# Patient Record
Sex: MALE | Race: WHITE | Employment: OTHER | URBAN - METROPOLITAN AREA
[De-identification: names, ages, dates, MRNs, and addresses within clinical notes are randomized per-mention and may not be internally consistent; named-entity substitution may affect disease eponyms.]

---

## 2017-04-20 PROBLEM — IMO0002 TRIGGER FINGER OF BOTH HANDS: Status: ACTIVE | Noted: 2017-04-20

## 2017-10-26 PROCEDURE — 81003 URINALYSIS AUTO W/O SCOPE: CPT | Performed by: INTERNAL MEDICINE

## 2018-10-31 PROBLEM — G47.33 OSA (OBSTRUCTIVE SLEEP APNEA): Status: ACTIVE | Noted: 2018-10-31

## 2018-10-31 PROBLEM — R35.0 BENIGN PROSTATIC HYPERPLASIA WITH URINARY FREQUENCY: Status: ACTIVE | Noted: 2018-10-31

## 2018-10-31 PROBLEM — M47.819 SPONDYLO-ARTHROPATHY: Status: ACTIVE | Noted: 2018-10-31

## 2018-10-31 PROBLEM — N40.1 BENIGN PROSTATIC HYPERPLASIA WITH URINARY FREQUENCY: Status: ACTIVE | Noted: 2018-10-31

## 2018-11-05 PROBLEM — R73.9 HYPERGLYCEMIA: Status: ACTIVE | Noted: 2018-11-05

## 2018-11-05 PROCEDURE — 81003 URINALYSIS AUTO W/O SCOPE: CPT | Performed by: FAMILY MEDICINE

## 2019-03-15 PROBLEM — E66.09 CLASS 1 OBESITY DUE TO EXCESS CALORIES WITHOUT SERIOUS COMORBIDITY WITH BODY MASS INDEX (BMI) OF 34.0 TO 34.9 IN ADULT: Status: ACTIVE | Noted: 2019-03-15

## 2019-09-26 PROBLEM — N18.30 CKD (CHRONIC KIDNEY DISEASE) STAGE 3, GFR 30-59 ML/MIN (HCC): Status: ACTIVE | Noted: 2019-09-26

## 2020-05-28 PROBLEM — E66.09 CLASS 1 OBESITY DUE TO EXCESS CALORIES WITH SERIOUS COMORBIDITY AND BODY MASS INDEX (BMI) OF 31.0 TO 31.9 IN ADULT: Status: ACTIVE | Noted: 2020-05-28

## 2020-06-09 PROBLEM — I48.3 TYPICAL ATRIAL FLUTTER (HCC): Status: ACTIVE | Noted: 2020-06-09

## 2020-12-03 PROBLEM — M47.819 SPONDYLO-ARTHROPATHY: Status: RESOLVED | Noted: 2018-10-31 | Resolved: 2020-12-03

## 2021-01-19 PROBLEM — Z00.00 PREVENTATIVE HEALTH CARE: Status: ACTIVE | Noted: 2021-01-19

## 2021-01-22 PROBLEM — E78.5 DYSLIPIDEMIA: Status: ACTIVE | Noted: 2021-01-22

## 2021-07-26 PROBLEM — I48.0 PAF (PAROXYSMAL ATRIAL FIBRILLATION) (HCC): Status: ACTIVE | Noted: 2021-07-26

## 2021-12-10 PROBLEM — G89.29 CHRONIC BILATERAL LOW BACK PAIN WITH RIGHT-SIDED SCIATICA: Status: ACTIVE | Noted: 2021-12-10

## 2021-12-10 PROBLEM — M54.41 CHRONIC BILATERAL LOW BACK PAIN WITH RIGHT-SIDED SCIATICA: Status: ACTIVE | Noted: 2021-12-10

## 2021-12-10 PROBLEM — Z96.89 S/P INSERTION OF SPINAL CORD STIMULATOR: Status: ACTIVE | Noted: 2021-12-10

## 2021-12-10 PROBLEM — M96.1 LUMBAR POST-LAMINECTOMY SYNDROME: Status: ACTIVE | Noted: 2021-12-10

## 2024-04-29 ENCOUNTER — APPOINTMENT (OUTPATIENT)
Dept: GENERAL RADIOLOGY | Facility: HOSPITAL | Age: 89
End: 2024-04-29
Attending: EMERGENCY MEDICINE
Payer: MEDICARE

## 2024-04-29 ENCOUNTER — HOSPITAL ENCOUNTER (INPATIENT)
Facility: HOSPITAL | Age: 89
LOS: 1 days | Discharge: HOME | End: 2024-04-30
Attending: EMERGENCY MEDICINE | Admitting: HOSPITALIST
Payer: MEDICARE

## 2024-04-29 ENCOUNTER — APPOINTMENT (OUTPATIENT)
Dept: MRI IMAGING | Facility: HOSPITAL | Age: 89
End: 2024-04-29
Attending: EMERGENCY MEDICINE
Payer: MEDICARE

## 2024-04-29 DIAGNOSIS — R27.0 ATAXIA: Primary | ICD-10-CM

## 2024-04-29 LAB
ALBUMIN SERPL-MCNC: 3.4 G/DL (ref 3.4–5)
ALBUMIN/GLOB SERPL: 1 {RATIO} (ref 1–2)
ALP LIVER SERPL-CCNC: 77 U/L
ALT SERPL-CCNC: 22 U/L
ANION GAP SERPL CALC-SCNC: 2 MMOL/L (ref 0–18)
AST SERPL-CCNC: 23 U/L (ref 15–37)
BASOPHILS # BLD AUTO: 0.04 X10(3) UL (ref 0–0.2)
BASOPHILS NFR BLD AUTO: 0.5 %
BILIRUB SERPL-MCNC: 0.8 MG/DL (ref 0.1–2)
BUN BLD-MCNC: 19 MG/DL (ref 9–23)
CALCIUM BLD-MCNC: 8.8 MG/DL (ref 8.5–10.1)
CHLORIDE SERPL-SCNC: 106 MMOL/L (ref 98–112)
CO2 SERPL-SCNC: 30 MMOL/L (ref 21–32)
CREAT BLD-MCNC: 1.14 MG/DL
EGFRCR SERPLBLD CKD-EPI 2021: 61 ML/MIN/1.73M2 (ref 60–?)
EOSINOPHIL # BLD AUTO: 0.27 X10(3) UL (ref 0–0.7)
EOSINOPHIL NFR BLD AUTO: 3.6 %
ERYTHROCYTE [DISTWIDTH] IN BLOOD BY AUTOMATED COUNT: 12.3 %
GLOBULIN PLAS-MCNC: 3.4 G/DL (ref 2.8–4.4)
GLUCOSE BLD-MCNC: 112 MG/DL (ref 70–99)
HCT VFR BLD AUTO: 45.7 %
HGB BLD-MCNC: 15.7 G/DL
IMM GRANULOCYTES # BLD AUTO: 0.02 X10(3) UL (ref 0–1)
IMM GRANULOCYTES NFR BLD: 0.3 %
LYMPHOCYTES # BLD AUTO: 1.48 X10(3) UL (ref 1–4)
LYMPHOCYTES NFR BLD AUTO: 19.9 %
MCH RBC QN AUTO: 34 PG (ref 26–34)
MCHC RBC AUTO-ENTMCNC: 34.4 G/DL (ref 31–37)
MCV RBC AUTO: 98.9 FL
MONOCYTES # BLD AUTO: 0.7 X10(3) UL (ref 0.1–1)
MONOCYTES NFR BLD AUTO: 9.4 %
NEUTROPHILS # BLD AUTO: 4.91 X10 (3) UL (ref 1.5–7.7)
NEUTROPHILS # BLD AUTO: 4.91 X10(3) UL (ref 1.5–7.7)
NEUTROPHILS NFR BLD AUTO: 66.3 %
OSMOLALITY SERPL CALC.SUM OF ELEC: 289 MOSM/KG (ref 275–295)
PLATELET # BLD AUTO: 231 10(3)UL (ref 150–450)
POTASSIUM SERPL-SCNC: 4.3 MMOL/L (ref 3.5–5.1)
PROT SERPL-MCNC: 6.8 G/DL (ref 6.4–8.2)
RBC # BLD AUTO: 4.62 X10(6)UL
SODIUM SERPL-SCNC: 138 MMOL/L (ref 136–145)
TROPONIN I SERPL HS-MCNC: <3 NG/L
WBC # BLD AUTO: 7.4 X10(3) UL (ref 4–11)

## 2024-04-29 PROCEDURE — 70551 MRI BRAIN STEM W/O DYE: CPT | Performed by: EMERGENCY MEDICINE

## 2024-04-29 PROCEDURE — 71045 X-RAY EXAM CHEST 1 VIEW: CPT | Performed by: EMERGENCY MEDICINE

## 2024-04-29 RX ORDER — BISACODYL 10 MG
10 SUPPOSITORY, RECTAL RECTAL
Status: DISCONTINUED | OUTPATIENT
Start: 2024-04-29 | End: 2024-04-30

## 2024-04-29 RX ORDER — ASCORBIC ACID 500 MG
500 TABLET ORAL DAILY
Status: DISCONTINUED | OUTPATIENT
Start: 2024-04-29 | End: 2024-04-30

## 2024-04-29 RX ORDER — FLUTICASONE PROPIONATE 50 MCG
2 SPRAY, SUSPENSION (ML) NASAL DAILY
Status: DISCONTINUED | OUTPATIENT
Start: 2024-04-29 | End: 2024-04-30

## 2024-04-29 RX ORDER — ACETAMINOPHEN 325 MG/1
650 TABLET ORAL 2 TIMES DAILY
Status: ON HOLD | COMMUNITY
End: 2024-04-29

## 2024-04-29 RX ORDER — MECLIZINE HCL 12.5 MG/1
12.5 TABLET ORAL 3 TIMES DAILY PRN
Status: DISCONTINUED | OUTPATIENT
Start: 2024-04-29 | End: 2024-04-30

## 2024-04-29 RX ORDER — ATORVASTATIN CALCIUM 10 MG/1
10 TABLET, FILM COATED ORAL NIGHTLY
Status: DISCONTINUED | OUTPATIENT
Start: 2024-04-29 | End: 2024-04-30

## 2024-04-29 RX ORDER — DILTIAZEM HYDROCHLORIDE 120 MG/1
120 CAPSULE, EXTENDED RELEASE ORAL DAILY
Status: DISCONTINUED | OUTPATIENT
Start: 2024-04-29 | End: 2024-04-30

## 2024-04-29 RX ORDER — ENEMA 19; 7 G/133ML; G/133ML
1 ENEMA RECTAL ONCE AS NEEDED
Status: DISCONTINUED | OUTPATIENT
Start: 2024-04-29 | End: 2024-04-30

## 2024-04-29 RX ORDER — TAMSULOSIN HYDROCHLORIDE 0.4 MG/1
0.8 CAPSULE ORAL DAILY
Status: DISCONTINUED | OUTPATIENT
Start: 2024-04-30 | End: 2024-04-30

## 2024-04-29 RX ORDER — ACETAMINOPHEN 500 MG
500 TABLET ORAL EVERY 4 HOURS PRN
Status: DISCONTINUED | OUTPATIENT
Start: 2024-04-29 | End: 2024-04-30

## 2024-04-29 RX ORDER — POLYETHYLENE GLYCOL 3350 17 G/17G
17 POWDER, FOR SOLUTION ORAL DAILY PRN
Status: DISCONTINUED | OUTPATIENT
Start: 2024-04-29 | End: 2024-04-30

## 2024-04-29 RX ORDER — DICYCLOMINE HCL 20 MG
20 TABLET ORAL AS NEEDED
Status: DISCONTINUED | OUTPATIENT
Start: 2024-04-29 | End: 2024-04-30

## 2024-04-29 RX ORDER — HYDROMORPHONE HYDROCHLORIDE 1 MG/ML
0.5 INJECTION, SOLUTION INTRAMUSCULAR; INTRAVENOUS; SUBCUTANEOUS EVERY 30 MIN PRN
Status: ACTIVE | OUTPATIENT
Start: 2024-04-29 | End: 2024-04-29

## 2024-04-29 RX ORDER — SENNOSIDES 8.6 MG
17.2 TABLET ORAL NIGHTLY PRN
Status: DISCONTINUED | OUTPATIENT
Start: 2024-04-29 | End: 2024-04-30

## 2024-04-29 RX ORDER — ONDANSETRON 2 MG/ML
4 INJECTION INTRAMUSCULAR; INTRAVENOUS EVERY 4 HOURS PRN
Status: ACTIVE | OUTPATIENT
Start: 2024-04-29 | End: 2024-04-29

## 2024-04-29 RX ORDER — FLUTICASONE PROPIONATE 50 MCG
2 SPRAY, SUSPENSION (ML) NASAL DAILY
COMMUNITY

## 2024-04-29 RX ORDER — LEVOTHYROXINE SODIUM 0.05 MG/1
50 TABLET ORAL DAILY
Status: DISCONTINUED | OUTPATIENT
Start: 2024-04-30 | End: 2024-04-30

## 2024-04-29 NOTE — ED QUICK NOTES
Pt arrives to ED A&O x4, pt able to articulate why he is here in the ED today. Pt moving all extremities and specifies his bouts of dizziness with standing still and looking down or to the right or left.  Pt denies recent sinus/inner ear infections.

## 2024-04-29 NOTE — ED QUICK NOTES
Spoke to MRI regarding the pt neuro simulator, MRI ask for make and model, this RN gave MRI that information. MRI requesting remote for the stimulator. Pt aware, pt will have his son bring the remote to the hospital.     Please call MRI once the remote arrives to the hospital. Pt aware of this plan.

## 2024-04-29 NOTE — H&P
.CC:   Chief Complaint   Patient presents with    Dizziness        PCP: Darien Bocanegra MD    History of Present Illness: Patient is a 89 year old male with PMH sig for aflutter on eliquis who presented with vertigo over past 2 days, particularly when looking up or down. Never had this before. Had persisted long enough that he decided to come in. No headaches, f/c, sob/cough, n/v/d/c, urinary sx. No numbness/tingling/weakness. When he ambulated in ED he was noted to be quite unstable/ataxic.       PMH  Past Medical History:    Actinic keratosis    Dr. Brush    Arthritis of right knee    had ortho visc x 3 in [place], [place], doing well 10/10/13    Atrial flutter (HCC)    Carotid stenosis    16-49% bilat, stable 2012    Creatinine elevation    1.38 [facility] ER    ED (erectile dysfunction)    viagra 100 helps sometimes    Eye tearing, bilateral    Foot pain, right    went to [facility] ER. art doppler nl. treated with gabapentin, resolved in 3 days    Hypertrophy of prostate without urinary obstruction and other lower urinary tract symptoms (LUTS)    Hypothyroid    Irritable bowel syndrome    dicyclomine works very well, has been on for years.     Left knee pain    bothers him from time to time    Living will in place    Lumbar radiculopathy, right    had ANTHONY, still with R leg weakness, foot and ankle numbness. has seen spine and pain mgmt. in therapy 10/20/16    Normal eye exam    Dr. Horowitz, Dr. Escobar in [place]. chronic water eyes.     Normal nuclear stress test    EF 66%    Obesity    VANESSA (obstructive sleep apnea)    treated with UPPP    Other and unspecified hyperlipidemia    Personal history of other malignant neoplasm of skin    Dr. Payne sees twice a year, Dr. Matute for Mohs surgery    Presbyacusia    hearing aides    RBBB    Screening for cardiovascular condition    echo with trace MR, mild pulm htn, mild AI    Screening for osteoporosis    normal dexa    Seasonal allergic rhinitis    springtime to mid  July    Skin cancer    Dr. Payne sees twice a year, Dr. Matute for Mohs surgery    Syncope    w/u neg    Trigger finger of both hands    Unspecified essential hypertension        PSH  Past Surgical History:   Procedure Laterality Date    Appendectomy      Colonoscopy,diagnostic  1-02, 4-4-07    tubular adenoma, saw Dr. Limon 8/30/12, deferred due to low risk    Inguinal hernia repair Left 2003    Injection, anesthetic/steroid, transforaminal epidural; lumbar/sacral, single level Left 6/9/2015    Procedure: TRANSFORAMINAL EPIDURAL - LUMBAR;  Surgeon: Hank Green MD;  Location: [facility]    Injection, anesthetic/steroid, transforaminal epidural; lumbar/sacral, single level Left 6/30/2015    Procedure: TRANSFORAMINAL EPIDURAL - LUMBAR;  Surgeon: Hank Green MD;  Location: [facility]    Injection, anesthetic/steroid, transforaminal epidural; lumbar/sacral, single level N/A 7/14/2015    Procedure: LUMBAR EPIDURAL;  Surgeon: Hank Green MD;  Location: [facility]    Injection, anesthetic/steroid, transforaminal epidural; lumbar/sacral, single level N/A 9/3/2015    Procedure: TRANSFORAMINAL EPIDURAL - LUMBAR;  Surgeon: Vick Romeo MD;  Location: [facility]    Injection, anesthetic/steroid, transforaminal epidural; lumbar/sacral, single level Right 7/14/2016    Procedure: TRANSFORAMINAL EPIDURAL - LUMBAR;  Surgeon: Hank Green MD;  Location: [facility]    Injection, anesthetic/steroid, transforaminal epidural; lumbar/sacral, single level Right 7/28/2016    Procedure: TRANSFORAMINAL EPIDURAL - LUMBAR;  Surgeon: Hank Green MD;  Location: [facility]    Other surgical history  9/24/15    L2-S1 lami fusion, Dr. Lindsey at [place]    Patient documented not to have experienced any of the following events Right 7/14/2016    Procedure: TRANSFORAMINAL EPIDURAL - LUMBAR;  Surgeon:  Hank Green MD;  Location: [facility]    Patient documented not to have experienced any of the following events Right 2016    Procedure: TRANSFORAMINAL EPIDURAL - LUMBAR;  Surgeon: Hank Green MD;  Location: [facility]    Patient withough preoperative order for iv antibiotic surgical site infection prophylaxis. Right 2016    Procedure: TRANSFORAMINAL EPIDURAL - LUMBAR;  Surgeon: Hank Green MD;  Location: [facility]    Patient withough preoperative order for iv antibiotic surgical site infection prophylaxis. Right 2016    Procedure: TRANSFORAMINAL EPIDURAL - LUMBAR;  Surgeon: Hank Green MD;  Location: [facility]    Skin surgery  1-3-12    BCC with Squamous diff to left nasal tip/ mohs    Special service or report Right     skin Ca on R shoulder    Special service or report      UPPP for sleep apnea    Spinal fusion  2015    L2 through S1     Tonsillectomy          ALL:  Allergies   Allergen Reactions    Seasonal      Itchy eyes, runny nose        Home Medications:  No outpatient medications have been marked as taking for the 24 encounter (Hospital Encounter).         Soc Hx  Social History     Tobacco Use    Smoking status: Former     Current packs/day: 0.00     Average packs/day: 3.0 packs/day for 18.0 years (54.0 ttl pk-yrs)     Types: Cigarettes     Start date: 1953     Quit date: 1971     Years since quittin.3    Smokeless tobacco: Never   Substance Use Topics    Alcohol use: Yes     Alcohol/week: 14.0 standard drinks of alcohol     Types: 7 Glasses of wine, 7 Cans of beer per week        Fam Hx  Family History   Problem Relation Age of Onset    Obesity Father     Cancer Father         prostate    Cancer Mother         lung    Heart Disorder Sister     Cancer Daughter         skin    Cancer Daughter         skin    Cancer Daughter         skin    Other (hip  replacement[other]) Daughter     Other ([other]) Daughter         crohn's    Other ([other]) Daughter         skin prob    Other ([other]) Sister         TKR bilat, venous stasis, CTS    Other (GERD[other]) Son     Cancer Son 58        prostate    Other (hemochromatosis[other]) Son     Cancer Son         skin ca    Cancer Son         skin cancer       Review of Systems  Comprehensive ROS reviewed and negative except for what's stated above.       OBJECTIVE:  /70   Pulse 80   Temp 97.9 °F (36.6 °C) (Temporal)   Resp 20   Ht 5' 7\" (1.702 m)   Wt 200 lb (90.7 kg)   SpO2 95%   BMI 31.32 kg/m²     Gen- NAD, appears stated age  HEENT- NCAT, anicteric sclera, MMM, OP clear  Lymph- no cervical LAD  CV- RRR no murmurs. No MONICA  Lungs- CTAB, good respiratory effort  Abd- soft, ntnd, no organomegaly, BS+  Derm- no rashes  MSK- good muscle strength and tone, no joint swelling  Neuro- A&OX3, no focal deficits. Nl finger-nose. No visible nystagmus      Diagnostic Data:    CBC/Chem  Recent Labs   Lab 04/29/24  0857   WBC 7.4   HGB 15.7   MCV 98.9   .0       Recent Labs   Lab 04/29/24  0857      K 4.3      CO2 30.0   BUN 19   CREATSERUM 1.14   *   CA 8.8       Recent Labs   Lab 04/29/24  0857   ALT 22   AST 23   ALB 3.4       No results for input(s): \"TROP\" in the last 168 hours.    Additional Diagnostics: personally reviewed EKG- nsr, RBBB, LAFB    CXR: image personally reviewed- b/l patchy infiltrates at bases    Radiology: XR CHEST AP PORTABLE  (CPT=71045)    Result Date: 4/29/2024  PROCEDURE:  XR CHEST AP PORTABLE  (CPT=71045)  TECHNIQUE:  AP chest radiograph was obtained.  COMPARISON:  None.  INDICATIONS:  weakness and dizziness assess for CHF  PATIENT STATED HISTORY: (As transcribed by Technologist)  Patient was feeling weak and dizzy.    FINDINGS:  Patchy opacity in the lower lungs may represent developing infiltrates.  Clinical correlation recommended along with follow-up to confirm  resolution.  Degenerative changes in the shoulders and spine.  Cardiomegaly with normal pulmonary vascularity. No pleural effusion or pneumothorax.            CONCLUSION:  Patchy opacity in the lower lungs may represent developing infiltrates.  Clinical correlation recommended along with follow-up to confirm resolution.    LOCATION:  [id number]      Dictated by (CST): Sea Garcia MD on 4/29/2024 at 10:02 AM     Finalized by (CST): Sea Garcia MD on 4/29/2024 at 10:03 AM          Available outpatient records reviewed--    ASSESSMENT / PLAN:   89 yo man with h/o aflutter on eliquis who presented with vertigo/ataxia.    Vertigo, ataxia  - need to r/o brainstem CVA, await MRI  - neuro consult  - could consider trying meclizine    Aflutter  - eliquis, cartia    Hl- cont statin    scds          Abigail Pavon MD  [facility] Hospitalist  Pager [phone]  Answering Service number: [phone]

## 2024-04-29 NOTE — ED PROVIDER NOTES
Patient Seen in: [facility] Emergency Department      History     Chief Complaint   Patient presents with    Dizziness     Stated Complaint:     Subjective:   HPI    89-year-old male who presents here to the emergency department complaint of occasional dizziness.  The patient 70 moves his head he starts to feel somewhat lightheaded like the world spinning.  No episodes of syncope or near syncope.  Said he almost stumbled earlier today when he was walking came to the ER for evaluation.  No palpitations or chest pain.  No shortness of breath.  Symptoms occur when he looks down.  Reviewing his records he was last seen for an office visit on 2/1/2024 for atrial flutter.    Objective:   Past Medical History:    Actinic keratosis    Dr. Brush    Arthritis of right knee    had ortho visc x 3 in [place], [place], doing well 10/10/13    Atrial flutter (HCC)    Carotid stenosis    16-49% bilat, stable 2012    Creatinine elevation    1.38 [facility] ER    ED (erectile dysfunction)    viagra 100 helps sometimes    Eye tearing, bilateral    Foot pain, right    went to [facility] ER. art doppler nl. treated with gabapentin, resolved in 3 days    Hypertrophy of prostate without urinary obstruction and other lower urinary tract symptoms (LUTS)    Hypothyroid    Irritable bowel syndrome    dicyclomine works very well, has been on for years.     Left knee pain    bothers him from time to time    Living will in place    Lumbar radiculopathy, right    had ANTHONY, still with R leg weakness, foot and ankle numbness. has seen spine and pain mgmt. in therapy 10/20/16    Normal eye exam    Dr. Horowitz, Dr. Escobar in [place]. chronic water eyes.     Normal nuclear stress test    EF 66%    Obesity    VANESSA (obstructive sleep apnea)    treated with UPPP    Other and unspecified hyperlipidemia    Personal history of other malignant neoplasm of skin    Dr. Payne sees twice a year, Dr. Matute for Mohs surgery    Presbyacusia    hearing aides     RBBB    Screening for cardiovascular condition    echo with trace MR, mild pulm htn, mild AI    Screening for osteoporosis    normal dexa    Seasonal allergic rhinitis    springtime to mid July    Skin cancer    Dr. Payne sees twice a year, Dr. Matute for Mohs surgery    Syncope    w/u neg    Trigger finger of both hands    Unspecified essential hypertension              Past Surgical History:   Procedure Laterality Date    Appendectomy      Colonoscopy,diagnostic  1-02, 4-4-07    tubular adenoma, saw Dr. Limon 8/30/12, deferred due to low risk    Inguinal hernia repair Left 2003    Injection, anesthetic/steroid, transforaminal epidural; lumbar/sacral, single level Left 6/9/2015    Procedure: TRANSFORAMINAL EPIDURAL - LUMBAR;  Surgeon: Hank Green MD;  Location: [facility]    Injection, anesthetic/steroid, transforaminal epidural; lumbar/sacral, single level Left 6/30/2015    Procedure: TRANSFORAMINAL EPIDURAL - LUMBAR;  Surgeon: Hank Green MD;  Location: [facility]    Injection, anesthetic/steroid, transforaminal epidural; lumbar/sacral, single level N/A 7/14/2015    Procedure: LUMBAR EPIDURAL;  Surgeon: Hank Green MD;  Location: [facility]    Injection, anesthetic/steroid, transforaminal epidural; lumbar/sacral, single level N/A 9/3/2015    Procedure: TRANSFORAMINAL EPIDURAL - LUMBAR;  Surgeon: Vick Romeo MD;  Location: [facility]    Injection, anesthetic/steroid, transforaminal epidural; lumbar/sacral, single level Right 7/14/2016    Procedure: TRANSFORAMINAL EPIDURAL - LUMBAR;  Surgeon: Hank Green MD;  Location: [facility]    Injection, anesthetic/steroid, transforaminal epidural; lumbar/sacral, single level Right 7/28/2016    Procedure: TRANSFORAMINAL EPIDURAL - LUMBAR;  Surgeon: Hank Green MD;  Location: [facility]    Other surgical history  9/24/15    L2-S1  davidi Dr. Rosalia figueroa at [place]    Patient documented not to have experienced any of the following events Right 2016    Procedure: TRANSFORAMINAL EPIDURAL - LUMBAR;  Surgeon: Hank Green MD;  Location: [facility]    Patient documented not to have experienced any of the following events Right 2016    Procedure: TRANSFORAMINAL EPIDURAL - LUMBAR;  Surgeon: Hank Green MD;  Location: [facility]    Patient withough preoperative order for iv antibiotic surgical site infection prophylaxis. Right 2016    Procedure: TRANSFORAMINAL EPIDURAL - LUMBAR;  Surgeon: Hank Green MD;  Location: [facility]    Patient withough preoperative order for iv antibiotic surgical site infection prophylaxis. Right 2016    Procedure: TRANSFORAMINAL EPIDURAL - LUMBAR;  Surgeon: Hank Green MD;  Location: [facility]    Skin surgery  1-3-12    BCC with Squamous diff to left nasal tip/ mohs    Special service or report Right     skin Ca on R shoulder    Special service or report      UPPP for sleep apnea    Spinal fusion  2015    L2 through S1     Tonsillectomy                  Social History     Socioeconomic History    Marital status:     Number of children: 12   Occupational History    Occupation: , retired   Tobacco Use    Smoking status: Former     Current packs/day: 0.00     Average packs/day: 3.0 packs/day for 18.0 years (54.0 ttl pk-yrs)     Types: Cigarettes     Start date: 1953     Quit date: 1971     Years since quittin.3    Smokeless tobacco: Never   Vaping Use    Vaping status: Never Used   Substance and Sexual Activity    Alcohol use: Yes     Alcohol/week: 14.0 standard drinks of alcohol     Types: 7 Glasses of wine, 7 Cans of beer per week    Drug use: No    Sexual activity: Yes     Partners: Female   Other Topics Concern     Service No    Blood Transfusions No     Caffeine Concern Yes     Comment: 3-4 cups coffee in am    Occupational Exposure No    Hobby Hazards No    Sleep Concern No    Stress Concern No    Weight Concern Yes     Comment: lost weight w/o trying    Special Diet No    Back Care Yes     Comment: chiropractor    Exercise Yes     Comment: limited due to back issues, recent spinal cord stimulator placed 10/26/17    Bike Helmet No    Seat Belt Yes    Self-Exams No     Comment: Sees Dr. Brush regularly for skin care and cancer f/u              Review of Systems    Positive for stated complaint:   Other systems are as noted in HPI.  Constitutional and vital signs reviewed.      All other systems reviewed and negative except as noted above.    Physical Exam     ED Triage Vitals   BP 04/29/24 0858 (!) 168/68   Pulse 04/29/24 0858 75   Resp 04/29/24 0858 14   Temp 04/29/24 0858 97.9 °F (36.6 °C)   Temp src 04/29/24 0858 Temporal   SpO2 04/29/24 0858 96 %   O2 Device 04/29/24 0900 None (Room air)       Current:BP (!) 161/78   Pulse 72   Temp 97.9 °F (36.6 °C) (Temporal)   Resp 21   Ht 170.2 cm (5' 7\")   Wt 90.7 kg   SpO2 93%   BMI 31.32 kg/m²         Physical Exam  General: This a pleasant nontoxic appearing patient in no apparent distress alert and oriented ×3  HEENT: Pupils are equal reactive to light.  Extra ocular motions are intact.  No scleral icterus or conjunctival pallor: Neck is supple without tenderness on palpation.  Head is atraumatic normocephalic.  Oral mucosa moist.  Tongue is midline.  No posterior pharyngeal lesions.  Lungs: Clear to auscultation bilaterally.  No wheezes, rhonchi, or rales appreciated.  No accessory muscle use noted for breathing.  Cardiac: Regular rate and rhythm.  Normal S1 and 2 without murmurs or ectopy appreciated  Abdomen: Soft on examination without tenderness to deep palpation or to percussion.  No masses appreciated.  Bowel sounds are normoactive.  No CVA tenderness.  Extremities: No cyanosis, no edema or clubbing.   Pulses are +2.  Full range of motion is noted of the extremities without deformities.  No tenderness.  Neurologically intact.  No past-pointing on finger-nose examination.  Heel-to-shin examination is normal.       ED Course     Labs Reviewed   COMP METABOLIC PANEL (14) - Abnormal; Notable for the following components:       Result Value    Glucose 112 (*)     All other components within normal limits   TROPONIN I HIGH SENSITIVITY - Normal   CBC WITH DIFFERENTIAL WITH PLATELET    Narrative:     The following orders were created for panel order CBC With Differential With Platelet.  Procedure                               Abnormality         Status                     ---------                               -----------         ------                     CBC W/ DIFFERENTIAL[[id number]]                              Final result                 Please view results for these tests on the individual orders.   RAINBOW DRAW LAVENDER   RAINBOW DRAW LIGHT GREEN   RAINBOW DRAW BLUE   CBC W/ DIFFERENTIAL     EKG    Rate, intervals and axes as noted on EKG Report.  Rate: 73  Rhythm: Sinus Rhythm  Reading: Sinus rhythm with sinus arrhythmia first-degree AV block.  Right bundle branch block.  Left anterior cecal block.         Narrative  PROCEDURE:  XR CHEST AP PORTABLE  (CPT=71045)     TECHNIQUE:  AP chest radiograph was obtained.     COMPARISON:  None.     INDICATIONS:  weakness and dizziness assess for CHF     PATIENT STATED HISTORY: (As transcribed by Technologist)  Patient was feeling weak and dizzy.         FINDINGS:  Patchy opacity in the lower lungs may represent developing infiltrates.  Clinical correlation recommended along with follow-up to confirm resolution.  Degenerative changes in the shoulders and spine.  Cardiomegaly with normal pulmonary  vascularity. No pleural effusion or pneumothorax.                  Impression  CONCLUSION:  Patchy opacity in the lower lungs may represent developing infiltrates.  Clinical  correlation recommended along with follow-up to confirm resolution.          LOCATION:  [id number]                 Dictated by (CST): Sea Garcia MD on 4/29/2024 at 10:02 AM      Finalized by (CST): Sea Garcia MD on 4/29/2024 at 10:03 AM                   MDM    Differential diagnosis includes but is not limited to near-syncope, electrolyte abnormality, vertigo, symptomatic anemia, cardiac ischemia.  Patient's troponin was normal.  Glucose 112 rest of metabolic and was normal.  CBC was normal.  The patient has been hemodynamically stable here in the emergency department.  He appears well and nontoxic.  Chest x-ray performed  I reviewed the x-rays and agree with the radiologist report that showed patchy opacity in the lower lungs may represent developing infiltrates.  Patient otherwise appears nontoxic and well.  Symptoms may be related to his recent pneumonia he does not appear to have a central nervous system etiology to account for symptoms.  He is otherwise hemodynamically stable.  We will walk the patient to make sure that he is able to ambulate without risk of falling.    Patient has significantly ataxic gait and was unstable.  He will be sent for MRI of the brain to assess for intracranial abnormality that may account for his gait instability.  He says this is brand-new.  I did speak to the hospitalist service about his gait instability as MRI is pending.  I will also contact neurology regarding the patient's ataxia.  MRI still pending will be signed out to my colleague for assessment I work on the bed for admission in the meanwhile.     I did speak to neurology and they agreed that the patient to be admitted in the hospital while MRI is pending.  MRI results signed out to my colleague.              Medical Decision Making      Disposition and Plan     Clinical Impression:  1. Ataxia         Disposition:  There is no disposition on file for this visit.  There is no disposition time on file for this  visit.    Follow-up:  No follow-up provider specified.        Medications Prescribed:  Current Discharge Medication List

## 2024-04-29 NOTE — ED QUICK NOTES
Pt ambulated in the hallway, pt gait was not steady, pt states he is is not usually this unsteady and expressed concern about his vertigo.   ED MD aware.

## 2024-04-29 NOTE — ED INITIAL ASSESSMENT (HPI)
Pt arrives to ED via EMS for evaluation of dizziness and weakness, pt states he has has these intermittent symptoms for about 2 weeks. Pt states he feels dizzy when he is standing and looks down or to the R/L. Pt denies pain, n/v,d, pt states he is eating and drinking ok.

## 2024-04-30 VITALS
DIASTOLIC BLOOD PRESSURE: 57 MMHG | HEIGHT: 67 IN | BODY MASS INDEX: 31.39 KG/M2 | RESPIRATION RATE: 20 BRPM | WEIGHT: 200 LBS | SYSTOLIC BLOOD PRESSURE: 135 MMHG | HEART RATE: 72 BPM | OXYGEN SATURATION: 94 % | TEMPERATURE: 98 F

## 2024-04-30 PROBLEM — R42 VERTIGO: Status: ACTIVE | Noted: 2024-04-30

## 2024-04-30 PROBLEM — H81.09 MENIERE'S DISEASE: Status: ACTIVE | Noted: 2024-04-30

## 2024-04-30 LAB
ATRIAL RATE: 73 BPM
P AXIS: 103 DEGREES
P-R INTERVAL: 228 MS
Q-T INTERVAL: 398 MS
QRS DURATION: 140 MS
QTC CALCULATION (BEZET): 438 MS
R AXIS: -53 DEGREES
T AXIS: 4 DEGREES
VENTRICULAR RATE: 73 BPM

## 2024-04-30 PROCEDURE — 99223 1ST HOSP IP/OBS HIGH 75: CPT | Performed by: OTHER

## 2024-04-30 RX ORDER — MECLIZINE HYDROCHLORIDE 25 MG/1
25 TABLET ORAL 3 TIMES DAILY PRN
Qty: 20 TABLET | Refills: 0 | Status: SHIPPED | OUTPATIENT
Start: 2024-04-30

## 2024-04-30 NOTE — CONSULTS
[facility]   NEUROLOGY   CONSULT NOTE    Admission date: 4/29/2024  Reason for Consult: Dizziness/vertigo.  Chief Complaint:   Chief Complaint   Patient presents with    Dizziness   ________________________________________________________________    History     History of Presenting Illness  89 year old male hyperlipidemia, hypothyroidism, prostate hypertrophy, history of coronary artery disease with atrial flutter currently on anticoagulation with Eliquis, presented with increased symptoms of dizziness and vertigo.  Patient describes symptoms as occurring only when he looks down.  There was no diplopia, dysarthria, dysphagia, new weakness, numbness, paresthesias, confusion, disorientation or loss of consciousness.  Patient has history of hearing loss and wears hearing aid.  He also complains of intermittent tinnitus..  Symptoms spontaneously resolved.  Patient currently does not have any symptoms of positional dizziness/vertigo.    History obtained from patient, his wife and chart review.    Past Medical History:    Actinic keratosis    Dr. Brush    Arrhythmia    Arthritis of right knee    had ortho visc x 3 in [place], [place], doing well 10/10/13    Atrial flutter (HCC)    Carotid stenosis    16-49% bilat, stable 2012    Creatinine elevation    1.38 [facility]    ED (erectile dysfunction)    viagra 100 helps sometimes    Eye tearing, bilateral    Foot pain, right    went to [facility] ER. art doppler nl. treated with gabapentin, resolved in 3 days    Hearing impairment    High cholesterol    Hypertrophy of prostate without urinary obstruction and other lower urinary tract symptoms (LUTS)    Hypothyroid    Irritable bowel syndrome    dicyclomine works very well, has been on for years.     Left knee pain    bothers him from time to time    Living will in place    Lumbar radiculopathy, right    had ANTHONY, still with R leg weakness, foot and ankle numbness. has seen spine and pain mgmt. in therapy  10/20/16    Normal eye exam    Dr. Horowitz, Dr. Escobar in [place]. chronic water eyes.     Normal nuclear stress test    EF 66%    Obesity    VANESSA (obstructive sleep apnea)    treated with UPPP    Other and unspecified hyperlipidemia    Personal history of other malignant neoplasm of skin    Dr. Payne sees twice a year, Dr. Matute for Mohs surgery    Presbyacusia    hearing aides    RBBB    Screening for cardiovascular condition    echo with trace MR, mild pulm htn, mild AI    Screening for osteoporosis    normal dexa    Seasonal allergic rhinitis    springtime to mid July    Skin cancer    Dr. Payne sees twice a year, Dr. Matute for Mohs surgery    Syncope    w/u neg    Trigger finger of both hands    Unspecified essential hypertension    Visual impairment     Past Surgical History:   Procedure Laterality Date    Appendectomy      Colonoscopy,diagnostic  1-02, 4-4-07    tubular adenoma, saw Dr. Limon 8/30/12, deferred due to low risk    Inguinal hernia repair Left 2003    Injection, anesthetic/steroid, transforaminal epidural; lumbar/sacral, single level Left 6/9/2015    Procedure: TRANSFORAMINAL EPIDURAL - LUMBAR;  Surgeon: Hank Green MD;  Location: [facility]    Injection, anesthetic/steroid, transforaminal epidural; lumbar/sacral, single level Left 6/30/2015    Procedure: TRANSFORAMINAL EPIDURAL - LUMBAR;  Surgeon: Hank Green MD;  Location: [facility]    Injection, anesthetic/steroid, transforaminal epidural; lumbar/sacral, single level N/A 7/14/2015    Procedure: LUMBAR EPIDURAL;  Surgeon: Hank Green MD;  Location: [facility]    Injection, anesthetic/steroid, transforaminal epidural; lumbar/sacral, single level N/A 9/3/2015    Procedure: TRANSFORAMINAL EPIDURAL - LUMBAR;  Surgeon: Vick Romeo MD;  Location: [facility]    Injection, anesthetic/steroid, transforaminal epidural; lumbar/sacral, single level Right  7/14/2016    Procedure: TRANSFORAMINAL EPIDURAL - LUMBAR;  Surgeon: Hank Green MD;  Location: [facility]    Injection, anesthetic/steroid, transforaminal epidural; lumbar/sacral, single level Right 7/28/2016    Procedure: TRANSFORAMINAL EPIDURAL - LUMBAR;  Surgeon: Hank Green MD;  Location: [facility]    Other surgical history  9/24/15    L2-S1 lami fusion, Dr. Lindsey at [place]    Patient documented not to have experienced any of the following events Right 7/14/2016    Procedure: TRANSFORAMINAL EPIDURAL - LUMBAR;  Surgeon: Hank Green MD;  Location: [facility]    Patient documented not to have experienced any of the following events Right 7/28/2016    Procedure: TRANSFORAMINAL EPIDURAL - LUMBAR;  Surgeon: Hank Green MD;  Location: [facility]    Patient withough preoperative order for iv antibiotic surgical site infection prophylaxis. Right 7/14/2016    Procedure: TRANSFORAMINAL EPIDURAL - LUMBAR;  Surgeon: Hank Green MD;  Location: [facility]    Patient withough preoperative order for iv antibiotic surgical site infection prophylaxis. Right 7/28/2016    Procedure: TRANSFORAMINAL EPIDURAL - LUMBAR;  Surgeon: Hank Green MD;  Location: [facility]    Skin surgery  1-3-12    BCC with Squamous diff to left nasal tip/ mohs    Special service or report Right 2004    skin Ca on R shoulder    Special service or report  1980's    UPPP for sleep apnea    Spinal fusion  09/24/2015    L2 through S1     Tonsillectomy       Social History     Socioeconomic History    Marital status:     Number of children: 12   Occupational History    Occupation: , retired   Tobacco Use    Smoking status: Former     Current packs/day: 0.00     Average packs/day: 3.0 packs/day for 18.0 years (54.0 ttl pk-yrs)     Types: Cigarettes     Start date: 1/1/1953     Quit date: 1/1/1971      Years since quittin.3    Smokeless tobacco: Never   Vaping Use    Vaping status: Never Used   Substance and Sexual Activity    Alcohol use: Yes     Alcohol/week: 14.0 standard drinks of alcohol     Types: 7 Glasses of wine, 7 Cans of beer per week    Drug use: No    Sexual activity: Yes     Partners: Female   Other Topics Concern     Service No    Blood Transfusions No    Caffeine Concern Yes     Comment: 3-4 cups coffee in am    Occupational Exposure No    Hobby Hazards No    Sleep Concern No    Stress Concern No    Weight Concern Yes     Comment: lost weight w/o trying    Special Diet No    Back Care Yes     Comment: chiropractor    Exercise Yes     Comment: limited due to back issues, recent spinal cord stimulator placed 10/26/17    Bike Helmet No    Seat Belt Yes    Self-Exams No     Comment: Sees Dr. Brush regularly for skin care and cancer f/u     Social Determinants of Health     Food Insecurity: No Food Insecurity (2024)    Food Insecurity     Food Insecurity: Never true   Transportation Needs: No Transportation Needs (2024)    Transportation Needs     Lack of Transportation: No   Housing Stability: Low Risk  (2024)    Housing Stability     Housing Instability: No     Family History   Problem Relation Age of Onset    Obesity Father     Cancer Father         prostate    Cancer Mother         lung    Heart Disorder Sister     Cancer Daughter         skin    Cancer Daughter         skin    Cancer Daughter         skin    Other (hip replacement[other]) Daughter     Other ([other]) Daughter         crohn's    Other ([other]) Daughter         skin prob    Other ([other]) Sister         TKR bilat, venous stasis, CTS    Other (GERD[other]) Son     Cancer Son 58        prostate    Other (hemochromatosis[other]) Son     Cancer Son         skin ca    Cancer Son         skin cancer     Allergies   Allergies   Allergen Reactions    Seasonal      Itchy eyes, runny nose       Home  Meds  Current Outpatient Medications   Medication Instructions    CARTIA  MG Oral Capsule SR 24 Hr TAKE 1 CAPSULE BY MOUTH  DAILY    dicyclomine (BENTYL) 20 mg, Oral, As needed    ELIQUIS 5 MG Oral Tab TAKE 1 TABLET BY MOUTH  TWICE DAILY    YOHANNES-C 500 MG OR TABS 1 TABLET DAILY    fluticasone propionate 50 MCG/ACT Nasal Suspension 2 sprays, Each Nare, Daily    levothyroxine (SYNTHROID) 50 mcg, Oral, Daily    meclizine (ANTIVERT) 25 mg, Oral, 3 times daily PRN    simvastatin 20 MG Oral Tab TAKE 1 TABLET BY MOUTH AT  NIGHT    tamsulosin (FLOMAX) cap 2  q hs     Scheduled Meds:   apixaban  5 mg Oral BID    fluticasone propionate  2 spray Each Nare Daily    levothyroxine  50 mcg Oral Daily    atorvastatin  10 mg Oral Nightly    tamsulosin  0.8 mg Oral Daily    ascorbic acid  500 mg Oral Daily    dilTIAZem ER  120 mg Oral Daily     Continuous Infusions:  PRN Meds:  acetaminophen    polyethylene glycol (PEG 3350)    sennosides    bisacodyl    fleet enema    meclizine    dicyclomine    OBJECTIVE   VITAL SIGNS:   Temp:  [97.3 °F (36.3 °C)-98.6 °F (37 °C)] 97.5 °F (36.4 °C)  Pulse:  [59-81] 72  Resp:  [14-25] 20  BP: (123-169)/(52-92) 135/57  SpO2:  [88 %-97 %] 94 %    PHYSICAL EXAM:    NEUROLOGIC:    Mental Status:  A&O x 4, Follows simple commands, no obvious aphasia or dysarthria  Cranial nerves: PERRL.  Visual fields full.  EOMI.  Face symmetric with normal movement bilaterally.  Hearing grossly intact. Tongue midline with normal movements.   Motor: Drift:  Absent; Motor exam is 5 out of 5 in all extremities bilaterally  Sensation: Intact to light touch bilaterally  Cerebellar: Normal Finger-To-Nose test      LABORATORY DATA:  Last 24 hour labs were reviewed in detail.  Recent Labs   Lab 04/29/24  0857      K 4.3      CO2 30.0   *   BUN 19   CREATSERUM 1.14     Recent Labs   Lab 04/29/24  0857   WBC 7.4   HGB 15.7   .0     Recent Labs   Lab 04/29/24  0857   ALT 22   AST 23     Radiology:     MRI BRAIN (CPT=70551)    Result Date: 4/29/2024  CONCLUSION:  1. No acute intracranial process noted. 2. Mild diffuse atrophy and white matter disease consistent with chronic small vessel ischemic changes.   [place]   Dictated by (CST): Kishore Geiger DO on 4/29/2024 at 6:54 PM     Finalized by (CST): Kishore Geiger DO on 4/29/2024 at 6:56 PM      ASSESSMENT/PLAN   89 year old male with:    Ménière's disease.  Advised symptomatic treatment as well as OT/PT.  Concern for stroke.  No evidence of stroke noted on MRI of the brain.  Advised to continue statin and Eliquis for stroke prophylaxis.  History of atrial flutter.  Patient to continue Eliquis as prescribed by cardiologist..   No active neurointervention needed at this time.  Patient to follow-up with his primary care after discharge.    Patient with symptoms of dizziness/vertigo most likely due to Principal Problem:    Ataxia       Bal Saxena MD  Neurohospitalist  [facility]    Disclaimer: This record was dictated using Dragon software. There may be errors due to voice recognition problems that were not realized and corrected during the completion of the note.

## 2024-04-30 NOTE — ED QUICK NOTES
Bedside report from Vanda JOHNSON RN. Pt resting in bed, family at bedside. Pt requesting food. He was informed by BECCA Dc that MD OK'd apple sauce for now which was given to pt with iced water. Pt denies further need at this time

## 2024-04-30 NOTE — PLAN OF CARE
Assumed care at 0730  A&Ox4, VSS, up with standby assist   No complaints of pain   No change in neuro status   Tolerating diet   Medications, prescriptions and AVS reviewed with patient and family- verbalized understanding  All questions answered     NURSING DISCHARGE NOTE    Discharged Home via Wheelchair.  Accompanied by Support staff  Belongings Taken by patient/family.

## 2024-04-30 NOTE — PLAN OF CARE
NURSING ADMISSION NOTE      Patient admitted via cart to RM 7616  AOX4, follows commands,  [place], bilateral hearing aids.  Mild dizziness when up in the bathroom, per patient it improves.  Due meds given,  Neuro to see.vital signs stable.  Fall prevention  Oriented to room.  Safety precautions initiated.  Bed in low position.  Call light in reach.  Problem: Patient/Family Goals  Goal: Patient/Family Long Term Goal  Description: Patient's Long Term Goal:resolve dizziness    Interventions:  - medicine  -fall prevention  -neuro to see    - See additional Care Plan goals for specific interventions  Outcome: Progressing  Goal: Patient/Family Short Term Goal  Description: Patient's Short Term Goal: able to rest/sleep    Interventions:   - pain med  as needed  -cluster care  -dim and quiet room  -bed alarm  -needs attended  - See additional Care Plan goals for specific interventions  Outcome: Progressing     Problem: SAFETY ADULT - FALL  Goal: Free from fall injury  Description: INTERVENTIONS:  - Assess pt frequently for physical needs  - Identify cognitive and physical deficits and behaviors that affect risk of falls.  - [place] fall precautions as indicated by assessment.  - Educate pt/family on patient safety including physical limitations  - Instruct pt to call for assistance with activity based on assessment  - Modify environment to reduce risk of injury  - Provide assistive devices as appropriate  - Consider OT/PT consult to assist with strengthening/mobility  - Encourage toileting schedule  Outcome: Progressing     Problem: NEUROLOGICAL - ADULT  Goal: Achieves maximal functionality and self care  Description: INTERVENTIONS  - Monitor swallowing and airway patency with patient fatigue and changes in neurological status  - Encourage and assist patient to increase activity and self care with guidance from PT/OT  - Encourage visually impaired, hearing impaired and aphasic patients to use assistive/communication  devices  Outcome: Progressing

## 2024-04-30 NOTE — CM/SW NOTE
Pt admits from [facility]. SW notified by RN of pt being medically cleared for DC, recommendations by Neurology for outpatient therapies. SW met with pt/dtr, made aware of recommendations. Printed orders for PT/OT for pt to provide to onsite therapy to assess. RN aware.    CHILANGO Guillen

## 2024-04-30 NOTE — ED QUICK NOTES
Orders for admission, patient is aware of plan and ready to go upstairs. Any questions, please call ED RN Vanda at extension [phone].     Patient Covid vaccination status: Fully vaccinated     COVID Test Ordered in ED: None    COVID Suspicion at Admission: N/A    Running Infusions:  None    Mental Status/LOC at time of transport: a/ox4    Other pertinent information:   CIWA score: N/A   NIH score:  N/A

## 2024-05-01 NOTE — PAYOR COMM NOTE
--------------  ADMISSION REVIEW     Payor: [employer]  Subscriber #:  [id number]  Authorization Number: [id number]    Admit date: 4/29/24  Admit time:  8:37 PM       REVIEW DOCUMENTATION:    ED Provider Notes signed by Nura Rogers MD at 4/29/2024  2:53 PM    Patient Seen in: [facility] Emergency Department    History     Chief Complaint   Patient presents with    Dizziness     Stated Complaint:     Subjective:   HPI    89-year-old male who presents here to the emergency department complaint of occasional dizziness.  The patient 70 moves his head he starts to feel somewhat lightheaded like the world spinning.  No episodes of syncope or near syncope.  Said he almost stumbled earlier today when he was walking came to the ER for evaluation.  No palpitations or chest pain.  No shortness of breath.  Symptoms occur when he looks down.  Reviewing his records he was last seen for an office visit on 2/1/2024 for atrial flutter.    Objective:   Past Medical History:    Actinic keratosis    Dr. Brush    Arthritis of right knee    had ortho visc x 3 in [place], [place], doing well 10/10/13    Atrial flutter (HCC)    Carotid stenosis    16-49% bilat, stable 2012    Creatinine elevation    1.38 [facility]    ED (erectile dysfunction)    viagra 100 helps sometimes    Eye tearing, bilateral    Foot pain, right    went to [facility] ER. art doppler nl. treated with gabapentin, resolved in 3 days    Hypertrophy of prostate without urinary obstruction and other lower urinary tract symptoms (LUTS)    Hypothyroid    Irritable bowel syndrome    dicyclomine works very well, has been on for years.     Left knee pain    bothers him from time to time    Living will in place    Lumbar radiculopathy, right    had ANTHONY, still with R leg weakness, foot and ankle numbness. has seen spine and pain mgmt. in therapy 10/20/16    Normal eye exam    Dr. Horowitz, Dr. Escobar in [place]. chronic water eyes.     Normal nuclear  stress test    EF 66%    Obesity    VANESSA (obstructive sleep apnea)    treated with UPPP    Other and unspecified hyperlipidemia    Personal history of other malignant neoplasm of skin    Dr. Payne sees twice a year, [facility]    Presbyacusia    hearing aides    RBBB    Screening for cardiovascular condition    echo with trace MR, mild pulm htn, mild AI    Screening for osteoporosis    normal dexa    Seasonal allergic rhinitis    springtime to mid July    Skin cancer    Dr. Payne sees twice a year, Dr. Matute [facility]    Syncope    w/u neg    Trigger finger of both hands    Unspecified essential hypertension     Positive for stated complaint:   Other systems are as noted in HPI.  Constitutional and vital signs reviewed.      All other systems reviewed and negative except as noted above.    Physical Exam     ED Triage Vitals   BP 04/29/24 0858 (!) 168/68   Pulse 04/29/24 0858 75   Resp 04/29/24 0858 14   Temp 04/29/24 0858 97.9 °F (36.6 °C)   Temp src 04/29/24 0858 Temporal   SpO2 04/29/24 0858 96 %   O2 Device 04/29/24 0900 None (Room air)       Current:BP (!) 161/78   Pulse 72   Temp 97.9 °F (36.6 °C) (Temporal)   Resp 21   Ht 170.2 cm (5' 7\")   Wt 90.7 kg   SpO2 93%   BMI 31.32 kg/m²         Physical Exam  General: This a pleasant nontoxic appearing patient in no apparent distress alert and oriented ×3  HEENT: Pupils are equal reactive to light.  Extra ocular motions are intact.  No scleral icterus or conjunctival pallor: Neck is supple without tenderness on palpation.  Head is atraumatic normocephalic.  Oral mucosa moist.  Tongue is midline.  No posterior pharyngeal lesions.  Lungs: Clear to auscultation bilaterally.  No wheezes, rhonchi, or rales appreciated.  No accessory muscle use noted for breathing.  Cardiac: Regular rate and rhythm.  Normal S1 and 2 without murmurs or ectopy appreciated  Abdomen: Soft on examination without tenderness to deep palpation or to percussion.  No  masses appreciated.  Bowel sounds are normoactive.  No CVA tenderness.  Extremities: No cyanosis, no edema or clubbing.  Pulses are +2.  Full range of motion is noted of the extremities without deformities.  No tenderness.  Neurologically intact.  No past-pointing on finger-nose examination.  Heel-to-shin examination is normal.     ED Course     Labs Reviewed   COMP METABOLIC PANEL (14) - Abnormal; Notable for the following components:       Result Value    Glucose 112 (*)     All other components within normal limits   TROPONIN I HIGH SENSITIVITY - Normal   CBC WITH DIFFERENTIAL WITH PLATELET    Narrative:     The following orders were created for panel order CBC With Differential With Platelet.  Procedure                               Abnormality         Status                     ---------                               -----------         ------                     CBC W/ DIFFERENTIAL[[id number]]                              Final result                 Please view results for these tests on the individual orders.   RAINBOW DRAW LAVENDER   RAINBOW DRAW LIGHT GREEN   RAINBOW DRAW BLUE   CBC W/ DIFFERENTIAL     EKG    Rate, intervals and axes as noted on EKG Report.  Rate: 73  Rhythm: Sinus Rhythm  Reading: Sinus rhythm with sinus arrhythmia first-degree AV block.  Right bundle branch block.  Left anterior cecal block.    Narrative  PROCEDURE:  XR CHEST AP PORTABLE  (CPT=71045)     TECHNIQUE:  AP chest radiograph was obtained.     COMPARISON:  None.     INDICATIONS:  weakness and dizziness assess for CHF     PATIENT STATED HISTORY: (As transcribed by Technologist)  Patient was feeling weak and dizzy.         FINDINGS:  Patchy opacity in the lower lungs may represent developing infiltrates.  Clinical correlation recommended along with follow-up to confirm resolution.  Degenerative changes in the shoulders and spine.  Cardiomegaly with normal pulmonary  vascularity. No pleural effusion or pneumothorax.             Impression  CONCLUSION:  Patchy opacity in the lower lungs may represent developing infiltrates.  Clinical correlation recommended along with follow-up to confirm resolution.          LOCATION:  [id number]         Dictated by (CST): Sea Garcia MD on 4/29/2024 at 10:02 AM      Finalized by (CST): Sea Garcia MD on 4/29/2024 at 10:03 AM          MDM    Differential diagnosis includes but is not limited to near-syncope, electrolyte abnormality, vertigo, symptomatic anemia, cardiac ischemia.  Patient's troponin was normal.  Glucose 112 rest of metabolic and was normal.  CBC was normal.  The patient has been hemodynamically stable here in the emergency department.  He appears well and nontoxic.  Chest x-ray performed  I reviewed the x-rays and agree with the radiologist report that showed patchy opacity in the lower lungs may represent developing infiltrates.  Patient otherwise appears nontoxic and well.  Symptoms may be related to his recent pneumonia he does not appear to have a central nervous system etiology to account for symptoms.  He is otherwise hemodynamically stable.  We will walk the patient to make sure that he is able to ambulate without risk of falling.    Patient has significantly ataxic gait and was unstable.  He will be sent for MRI of the brain to assess for intracranial abnormality that may account for his gait instability.  He says this is brand-new.  I did speak to the hospitalist service about his gait instability as MRI is pending.  I will also contact neurology regarding the patient's ataxia.  MRI still pending will be signed out to my colleague for assessment I work on the bed for admission in the meanwhile.     I did speak to neurology and they agreed that the patient to be admitted in the hospital while MRI is pending.  MRI results signed out to my colleague.       Disposition and Plan     Clinical Impression:  1. Ataxia             4/29 H&P  History of Present Illness: Patient is a 89  year old male with PMH sig for aflutter on eliquis who presented with vertigo over past 2 days, particularly when looking up or down. Never had this before. Had persisted long enough that he decided to come in. No headaches, f/c, sob/cough, n/v/d/c, urinary sx. No numbness/tingling/weakness. When he ambulated in ED he was noted to be quite unstable/ataxic.      ASSESSMENT / PLAN:   89 yo man with h/o aflutter on eliquis who presented with vertigo/ataxia.     Vertigo, ataxia  - need to r/o brainstem CVA, await MRI  - neuro consult  - could consider trying meclizine     Aflutter  - eliquis, cartia     Hl- cont statin              4/30 Neurology  History of Presenting Illness  89 year old male hyperlipidemia, hypothyroidism, prostate hypertrophy, history of coronary artery disease with atrial flutter currently on anticoagulation with Eliquis, presented with increased symptoms of dizziness and vertigo.  Patient describes symptoms as occurring only when he looks down.  There was no diplopia, dysarthria, dysphagia, new weakness, numbness, paresthesias, confusion, disorientation or loss of consciousness.  Patient has history of hearing loss and wears hearing aid.  He also complains of intermittent tinnitus..  Symptoms spontaneously resolved.  Patient currently does not have any symptoms of positional dizziness/vertigo.     History obtained from patient, his wife and chart review    ASSESSMENT/PLAN   89 year old male with:     Ménière's disease.  Advised symptomatic treatment as well as OT/PT.  Concern for stroke.  No evidence of stroke noted on MRI of the brain.  Advised to continue statin and Eliquis for stroke prophylaxis.  History of atrial flutter.  Patient to continue Eliquis as prescribed by cardiologist..   No active neurointervention needed at this time.  Patient to follow-up with his primary care after discharge.     Patient with symptoms of dizziness/vertigo most likely due to Principal Problem:     Ataxia        MEDICATIONS ADMINISTERED IN LAST 1 DAY:  apixaban (Eliquis) tab 5 mg       Date Action Dose Route User    Discharged on 4/30/2024 4/30/2024 0913 Given 5 mg Oral Gladys Corcoran RN          ascorbic acid (Vitamin C) tab 500 mg       Date Action Dose Route User    Discharged on 4/30/2024 4/30/2024 0913 Given 500 mg Oral Gladys Corcoran RN          dilTIAZem ER (Dilacor XR) 24 hr cap 120 mg       Date Action Dose Route User    Discharged on 4/30/2024 4/30/2024 0913 Given 120 mg Oral Gladys Corcoran RN          fluticasone propionate (Flonase) 50 MCG/ACT nasal suspension 2 spray       Date Action Dose Route User    Discharged on 4/30/2024 4/30/2024 0917 Given 2 spray Each Nare Gladys Corcoran RN          levothyroxine (Synthroid) tab 50 mcg       Date Action Dose Route User    Discharged on 4/30/2024 4/30/2024 0913 Given 50 mcg Oral Gladys Corcoran RN          tamsulosin (Flomax) cap 0.8 mg       Date Action Dose Route User    Discharged on 4/30/2024 4/30/2024 0913 Given 0.8 mg Oral Gladys Corcoran RN            Vitals (last day) before discharge       Date/Time Temp Pulse Resp BP SpO2 Weight O2 Device O2 Flow Rate (L/min) [facility]    04/30/24 1200 97.5 °F (36.4 °C) 72 20 135/57 94 % -- None (Room air) --     04/30/24 0800 97.3 °F (36.3 °C) 75 18 135/61 94 % -- None (Room air) -- SS    04/30/24 0600 -- -- -- -- 94 % -- None (Room air) -- AB    04/30/24 0430 98 °F (36.7 °C) 59 -- 127/57 96 % -- Nasal cannula 1 L/min     04/30/24 0330 -- 63 -- -- 88 % -- Nasal cannula 1 L/min AB    04/30/24 0000 98.3 °F (36.8 °C) 75 -- 123/52 91 % -- None (Room air) --     04/29/24 2130 -- 77 -- 154/67 94 % -- -- -- AB    04/29/24 2100 98.6 °F (37 °C) 81 -- -- 95 % -- None (Room air) -- CD    04/29/24 2044 -- -- -- -- -- 200 lb -- -- AB    04/29/24 2025 -- 74 20 155/65 97 % -- None (Room air) -- EC    04/29/24 1945 -- 78 20 158/71 97 % -- None (Room air) -- EC    04/29/24 1930 -- 75 15 165/92 97 % --  None (Room air) -- EC    04/29/24 1915 -- 77 14 169/73 96 % -- None (Room air) --     04/29/24 1730 -- 75 24 156/64 97 % -- None (Room air) --     04/29/24 1615 -- 80 25 168/74 96 % -- None (Room air) --     04/29/24 1500 -- 80 20 133/70 95 % -- None (Room air) --     04/29/24 1430 -- 87 24 145/97 97 % -- None (Room air) --     04/29/24 1400 -- 72 21 161/78 93 % -- None (Room air) --     04/29/24 1330 -- 73 12 153/70 95 % -- None (Room air) --     04/29/24 1315 -- 79 12 160/70 96 % -- None (Room air) --     04/29/24 1300 -- 72 21 156/64 97 % -- None (Room air) --     04/29/24 1245 -- 85 31 193/86 93 % -- None (Room air) --     04/29/24 1230 -- 74 20 153/68 97 % -- None (Room air) --     04/29/24 1215 -- 76 21 146/68 93 % -- None (Room air) --     04/29/24 1200 -- 64 22 139/65 96 % -- None (Room air) --     04/29/24 1145 -- 79 22 157/69 95 % -- None (Room air) --     04/29/24 1130 -- 74 18 166/75 96 % -- None (Room air) --     04/29/24 1115 -- 80 24 147/66 -- -- None (Room air) --     04/29/24 1006 -- 66 16 136/64 95 % -- None (Room air) --     04/29/24 0900 -- 76 18 168/68 96 % -- None (Room air) --     04/29/24 0858 97.9 °F (36.6 °C) 75 14 168/68 96 % 200 lb -- -- KM             --------------  DISCHARGE REVIEW    Payor: [employer]  Subscriber #:  [id number]  Authorization Number: [id number]    Admit date: 4/29/24  Admit time:   8:37 PM  Discharge Date: 4/30/2024  4:12 PM     Admitting Physician: Abigail Pavon MD  Attending Physician:  Patricia att. providers found  Primary Care Physician: Darien Bocanegra MD

## 2024-05-03 NOTE — PROGRESS NOTES
.[facility] note    PCP: Darien Bocanegra MD    Chief Complaint:  F/u vertigo    SUBJECTIVE:  Dizziness/vertigo resolved on its own sometime last night. No nausea, no sob/cp.     OBJECTIVE:       Intake/Output:  No intake or output data in the 24 hours ending 05/03/24 1810    Last 3 Weights   04/29/24 2044 200 lb (90.7 kg)   04/29/24 0858 200 lb (90.7 kg)   12/10/21 1300 200 lb (90.7 kg)   11/02/21 1157 210 lb (95.3 kg)       Exam  Gen: No acute distress  HEENT: anicteric sclera, MMM  Pulm: Lungs clear, normal respiratory effort  CV: Heart with regular rate and rhythm, no peripheral edema  Abd: Abdomen soft, nontender, nondistended, no organomegaly, bowel sounds present  MSK: Full range of motion in extremities, no clubbing, no cyanosis  Skin: no rashes or lesions  Neuro: A&OX3, no focal deficits    Data Review:         Labs:     Recent Labs   Lab 04/29/24  0857   WBC 7.4   HGB 15.7   MCV 98.9   .0   NE 4.91   LYMABS 1.48       Recent Labs   Lab 04/29/24  0857      K 4.3      CO2 30.0   BUN 19   CREATSERUM 1.14   CA 8.8   *       Recent Labs   Lab 04/29/24  0857   ALT 22   AST 23   ALB 3.4       No results for input(s): \"TROP\", \"CK\", \"PBNP\", \"PCT\" in the last 168 hours.    No results for input(s): \"CRP\", \"NICHO\", \"LDH\", \"DDIMER\" in the last 168 hours.    No results for input(s): \"PGLU\" in the last 168 hours.    Meds:   Scheduled Medication:  Continuous Infusing Medication:  PRN Medication:       Microbiology:    No results found for this visit on 04/29/24.    No results found for: \"COVID19\"     Assessment/Plan:   89 yo man with h/o aflutter on eliquis who presented with vertigo/ataxia.     Vertigo, ataxia  - mri without brainstem cva  - sx now resolved on their own  - discussed outpt vestibular PT and/or trial of meclizine if recurs     Aflutter  - eliquis, cartia     Hl- cont statin    Okay with dc home today      Abigail Pavon MD  [facility]  Pager: [phone]